# Patient Record
Sex: FEMALE | Race: AMERICAN INDIAN OR ALASKA NATIVE
[De-identification: names, ages, dates, MRNs, and addresses within clinical notes are randomized per-mention and may not be internally consistent; named-entity substitution may affect disease eponyms.]

---

## 2018-08-12 ENCOUNTER — HOSPITAL ENCOUNTER (EMERGENCY)
Dept: HOSPITAL 42 - ED | Age: 23
Discharge: HOME | End: 2018-08-12
Payer: MEDICAID

## 2018-08-12 VITALS — OXYGEN SATURATION: 98 % | RESPIRATION RATE: 18 BRPM

## 2018-08-12 VITALS — SYSTOLIC BLOOD PRESSURE: 125 MMHG | DIASTOLIC BLOOD PRESSURE: 79 MMHG | HEART RATE: 72 BPM | TEMPERATURE: 98.5 F

## 2018-08-12 DIAGNOSIS — F41.9: ICD-10-CM

## 2018-08-12 DIAGNOSIS — R11.10: Primary | ICD-10-CM

## 2018-08-12 LAB
ALBUMIN SERPL-MCNC: 5 G/DL (ref 3–4.8)
ALBUMIN/GLOB SERPL: 1.3 {RATIO} (ref 1.1–1.8)
ALT SERPL-CCNC: 16 U/L (ref 7–56)
AMORPH SED URNS QL MICRO: (no result)
APAP SERPL-MCNC: < 10 UG/ML (ref 10–20)
APPEARANCE UR: (no result)
APTT BLD: 22 SECONDS (ref 25.1–36.5)
AST SERPL-CCNC: 24 U/L (ref 14–36)
BACTERIA #/AREA URNS HPF: (no result) /[HPF]
BASOPHILS # BLD AUTO: 0.01 K/MM3 (ref 0–2)
BASOPHILS NFR BLD: 0.1 % (ref 0–3)
BILIRUB UR-MCNC: NEGATIVE MG/DL
BUN SERPL-MCNC: 11 MG/DL (ref 7–21)
CALCIUM SERPL-MCNC: 10.2 MG/DL (ref 8.4–10.5)
COLOR UR: YELLOW
EOSINOPHIL # BLD: 0 10*3/UL (ref 0–0.7)
EOSINOPHIL NFR BLD: 0.1 % (ref 1.5–5)
EPI CELLS #/AREA URNS HPF: (no result) /HPF (ref 0–5)
ERYTHROCYTE [DISTWIDTH] IN BLOOD BY AUTOMATED COUNT: 13.2 % (ref 11.5–14.5)
GFR NON-AFRICAN AMERICAN: > 60
GLUCOSE UR STRIP-MCNC: NEGATIVE MG/DL
GRANULOCYTES # BLD: 4.53 10*3/UL (ref 1.4–6.5)
GRANULOCYTES NFR BLD: 62.8 % (ref 50–68)
HCG,QUALITATIVE URINE: NEGATIVE
HGB BLD-MCNC: 14.1 G/DL (ref 12–16)
INR PPP: 1.06
LEUKOCYTE ESTERASE UR-ACNC: (no result) LEU/UL
LIPASE SERPL-CCNC: 30 U/L (ref 23–300)
LYMPHOCYTES # BLD: 1.8 10*3/UL (ref 1.2–3.4)
LYMPHOCYTES NFR BLD AUTO: 24.3 % (ref 22–35)
MCH RBC QN AUTO: 27.1 PG (ref 25–35)
MCHC RBC AUTO-ENTMCNC: 35.7 G/DL (ref 31–37)
MCV RBC AUTO: 76 FL (ref 80–105)
MONOCYTES # BLD AUTO: 0.9 10*3/UL (ref 0.1–0.6)
MONOCYTES NFR BLD: 12.7 % (ref 1–6)
PH UR STRIP: 8 [PH] (ref 4.7–8)
PLATELET # BLD: 318 10^3/UL (ref 120–450)
PMV BLD AUTO: 10.4 FL (ref 7–11)
PROT UR STRIP-MCNC: 100 MG/DL
PROTHROMBIN TIME: 12.2 SECONDS (ref 9.4–12.5)
RBC # BLD AUTO: 5.2 10^6/UL (ref 3.5–6.1)
RBC # UR STRIP: (no result) /UL
SALICYLATES SERPL-MCNC: < 1 MG/DL (ref 2–20)
SP GR UR STRIP: 1.02 (ref 1–1.03)
UROBILINOGEN UR STRIP-ACNC: 0.2 E.U./DL
WBC # BLD AUTO: 7.2 10^3/UL (ref 4.5–11)

## 2018-08-12 PROCEDURE — 83735 ASSAY OF MAGNESIUM: CPT

## 2018-08-12 PROCEDURE — 96375 TX/PRO/DX INJ NEW DRUG ADDON: CPT

## 2018-08-12 PROCEDURE — 85025 COMPLETE CBC W/AUTO DIFF WBC: CPT

## 2018-08-12 PROCEDURE — 96361 HYDRATE IV INFUSION ADD-ON: CPT

## 2018-08-12 PROCEDURE — 85730 THROMBOPLASTIN TIME PARTIAL: CPT

## 2018-08-12 PROCEDURE — 83690 ASSAY OF LIPASE: CPT

## 2018-08-12 PROCEDURE — 85610 PROTHROMBIN TIME: CPT

## 2018-08-12 PROCEDURE — 96376 TX/PRO/DX INJ SAME DRUG ADON: CPT

## 2018-08-12 PROCEDURE — 84703 CHORIONIC GONADOTROPIN ASSAY: CPT

## 2018-08-12 PROCEDURE — 83992 ASSAY FOR PHENCYCLIDINE: CPT

## 2018-08-12 PROCEDURE — 81001 URINALYSIS AUTO W/SCOPE: CPT

## 2018-08-12 PROCEDURE — 80361 OPIATES 1 OR MORE: CPT

## 2018-08-12 PROCEDURE — 80053 COMPREHEN METABOLIC PANEL: CPT

## 2018-08-12 PROCEDURE — 99284 EMERGENCY DEPT VISIT MOD MDM: CPT

## 2018-08-12 PROCEDURE — 80329 ANALGESICS NON-OPIOID 1 OR 2: CPT

## 2018-08-12 PROCEDURE — 80345 DRUG SCREENING BARBITURATES: CPT

## 2018-08-12 PROCEDURE — 80324 DRUG SCREEN AMPHETAMINES 1/2: CPT

## 2018-08-12 PROCEDURE — 80353 DRUG SCREENING COCAINE: CPT

## 2018-08-12 PROCEDURE — 71046 X-RAY EXAM CHEST 2 VIEWS: CPT

## 2018-08-12 PROCEDURE — 74177 CT ABD & PELVIS W/CONTRAST: CPT

## 2018-08-12 PROCEDURE — 80358 DRUG SCREENING METHADONE: CPT

## 2018-08-12 PROCEDURE — 87086 URINE CULTURE/COLONY COUNT: CPT

## 2018-08-12 PROCEDURE — 80349 CANNABINOIDS NATURAL: CPT

## 2018-08-12 PROCEDURE — 80320 DRUG SCREEN QUANTALCOHOLS: CPT

## 2018-08-12 PROCEDURE — 80346 BENZODIAZEPINES1-12: CPT

## 2018-08-12 PROCEDURE — 96374 THER/PROPH/DIAG INJ IV PUSH: CPT

## 2018-08-12 PROCEDURE — 93005 ELECTROCARDIOGRAM TRACING: CPT

## 2018-08-12 NOTE — ED PDOC
Arrival/HPI





- General


Chief Complaint: Anxiety


Time Seen by Provider: 08/12/18 07:44


Historian: Patient





- History of Present Illness


Narrative History of Present Illness (Text): 





08/12/18 08:11


23 year old female, visiting from [place], [place], with past medical history 

of anxiety, presents to the Emergency department accompanied by boyfriend 

complaining of nausea and vomiting secondary to worsening anxiety since 3 days. 

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 

now experiencing nausea, vomiting and epigastric pain. Patient informs similar 

symptoms in the past for which she was hospitalized and "nothing was wrong" As 

per patient, she had a recent endoscopy performed last month with no acute 

findings. Patient denies any fever, chills, diarrhea, hematemesis, hematuria, 

dysuria, urinary output changes, chest pain, shortness of breath or any other 

complaints. Patient presents to the Emergency department for medical 

evaluation. 





08/12/18 17:57





Time/Duration: < week (3 days)


Symptom Onset: Gradual


Symptom Course: Unchanged


Activities at Onset: Light





Past Medical History





- Provider Review


Nursing Documentation Reviewed: Yes





- Infectious Disease


Hx of Infectious Diseases: None





- Pulmonary


Hx Asthma: Yes





- Psychiatric


Hx Anxiety: Yes


Hx Substance Use: No





Family/Social History





- Physician Review


Nursing Documentation Reviewed: Yes


Family/Social History: No Known Family HX


Smoking Status: Never Smoked


Hx Alcohol Use: No


Hx Substance Use: No





Allergies/Home Meds


Allergies/Adverse Reactions: 


Allergies





No Known Allergies Allergy (Verified 08/12/18 08:32)


 











Review of Systems





- Physician Review


All systems were reviewed & negative as marked: Yes





- Review of Systems


Constitutional: absent: Fevers


Respiratory: absent: SOB, Cough


Cardiovascular: absent: Chest Pain


Gastrointestinal: Abdominal Pain, Nausea, Vomiting.  absent: Diarrhea, 

Hematemesis


Genitourinary Female: absent: Dysuria, Hematuria, Urine Output Changes





Physical Exam


Vital Signs Reviewed: Yes


Vital Signs











  Temp Pulse Resp BP Pulse Ox


 


 08/12/18 11:27  98.5 F  72  18  125/79  98


 


 08/12/18 11:00  98.5 F  72  18  125/79  98


 


 08/12/18 09:35  99.5 F  89  18  132/89  98


 


 08/12/18 07:54  97.9 F  98 H  18  118/90  98











Temperature: Afebrile


Blood Pressure: Normal


Pulse: Tachycardic


Respiratory Rate: Normal


Appearance: Positive for: Other (Anxious)


Pain Distress: None


Mental Status: Positive for: Alert and Oriented X 3





- Systems Exam


Head: Present: Atraumatic, Normocephalic


Pupils: Present: PERRL


Extroacular Muscles: Present: EOMI


Conjunctiva: Present: Normal


Mouth: Present: Moist Mucous Membranes


Neck: Present: Normal Range of Motion


Respiratory/Chest: Present: Clear to Auscultation, Good Air Exchange.  No: 

Respiratory Distress, Accessory Muscle Use


Cardiovascular: Present: Regular Rate and Rhythm, Normal S1, S2.  No: Murmurs


Abdomen: Present: Tenderness (epigastric tenderness ).  No: Distention, 

Peritoneal Signs


Back: Present: Normal Inspection


Upper Extremity: Present: Normal Inspection.  No: Cyanosis, Edema


Lower Extremity: Present: Normal Inspection.  No: Edema


Neurological: Present: GCS=15, CN II-XII Intact, Speech Normal


Skin: Present: Warm, Dry, Normal Color.  No: Rashes


Psychiatric: Present: Alert, Oriented x 3, Anxious





Medical Decision Making


ED Course and Treatment: 





08/12/18 08:11


Impression: 23 year old female presents to the Emergency department complaining 

of anxiety and abdominal pain associated with nausea and vomiting. 





Differential Diagnosis included but are not limited to: anxiety vs gastritis/

pancreatitis pud





Plan:


-- EKG


-- CT of Abdomen/Pelvis


-- CXR


-- Labs


-- Ativan


-- IV Fluids


-- Urinalysis


-- Reassess and disposition





Progress Notes:





08/12/18 09:00


EKG: Ordered, reviewed, and independently interpreted the EKG.


Rate : 90 BPM


Rhythm : NSR


Interpretation : Patient shaking during exam secondary to emotion.





08/12/18 11:05


CT of Abdomen/Pelvis reviewed by radiologist, shows: 


Mild hepatomegaly with mild fatty infiltration. 


Urinary bladder exhibits thick-walled appearance in part due to incomplete 

distention however a cystitis should be excluded with followup urinalysis.





08/12/18 17:58


labs imagign neg. pt observed sleeping inad. abd soft. h/h stable. pt observed 

drinkning water in nad. advise outpt fu and return precautions





- Lab Interpretations


Lab Results: 








 08/12/18 08:10 





 08/12/18 08:10 





 Lab Results





08/12/18 08:20: Urine Opiates Screen Negative, Urine Methadone Screen Negative, 

Ur Barbiturates Screen Negative, Ur Phencyclidine Scrn Negative, Ur 

Amphetamines Screen Negative, U Benzodiazepines Scrn Negative, U Oth Cocaine 

Metabols Negative, U Cannabinoids Screen Positive H


08/12/18 08:20: Urine Color Yellow, Urine Appearance Cloudy, Urine pH 8.0, Ur 

Specific Gravity 1.020, Urine Protein 100 H, Urine Glucose (UA) Negative, Urine 

Ketones >=80, Urine Blood Trace-intact H, Urine Nitrate Negative, Urine 

Bilirubin Negative, Urine Urobilinogen 0.2, Ur Leukocyte Esterase Trace H, 

Urine RBC 1 - 3, Urine WBC 2 - 5, Ur Epithelial Cells Tntc, Amorphous Sediment 

Moderate, Urine Bacteria Trace, Urine HCG, Qual Negative


08/12/18 08:10: Alcohol, Quantitative < 10


08/12/18 08:10: Salicylates < 1 L, Acetaminophen < 10.0 L


08/12/18 08:10: Sodium 139, Potassium 3.6, Chloride 105, Carbon Dioxide 17 L, 

Anion Gap 21 H, BUN 11, Creatinine 0.6 L, Est GFR ( Amer) > 60, Est GFR (

Non-Af Amer) > 60, Random Glucose 125 H, Calcium 10.2, Magnesium 1.7, Total 

Bilirubin 0.8, AST 24, ALT 16, Alkaline Phosphatase 59, Total Protein 8.9 H, 

Albumin 5.0 H, Globulin 3.9, Albumin/Globulin Ratio 1.3, Lipase 30


08/12/18 08:10: PT 12.2, INR 1.06, APTT 22.0 L


08/12/18 08:10: WBC 7.2, RBC 5.20, Hgb 14.1, Hct 39.5, MCV 76.0 L, MCH 27.1, 

MCHC 35.7, RDW 13.2, Plt Count 318, MPV 10.4, Gran % 62.8, Lymph % (Auto) 24.3, 

Mono % (Auto) 12.7 H, Eos % (Auto) 0.1 L, Baso % (Auto) 0.1, Gran # 4.53, Lymph 

# (Auto) 1.8, Mono # (Auto) 0.9 H, Eos # (Auto) 0.0, Baso # (Auto) 0.01











- RAD Interpretation


Radiology Orders: 








08/12/18 09:05


ABD & PELVIS IV CONTRAST ONLY [CT] Stat 


CHEST TWO VIEWS (PA/LAT) [RAD] Stat 














- EKG Interpretation


Interpreted by ED Physician: Yes


Type: 12 lead EKG





- Medication Orders


Current Medication Orders: 











Discontinued Medications





Al Hydrox/Mg Hydrox/Simethicone (Maalox Plus 30 Ml)  30 ml PO STAT STA


   Stop: 08/12/18 10:25


   Last Admin: 08/12/18 10:32  Dose: 30 ml





Belladonna/Phenobarbital (Donnatal Elixir)  10 ml PO STAT STA


   Stop: 08/12/18 10:25


   Last Admin: 08/12/18 10:31  Dose: 10 ml





Sodium Chloride (Sodium Chloride 0.9%)  1,000 mls @ 1,000 mls/hr IV .Q1H STA


   Stop: 08/12/18 09:10


   Last Admin: 08/12/18 08:38  Dose: 1,000 mls/hr





eMAR Start Stop


 Document     08/12/18 08:38  LM  (Rec: 08/12/18 08:38  [id number]


     Intravenous Solution


      Start Date                                 08/12/18


      Start Time                                 08:38


      End Date                                   08/12/18


      End time                                   09:39


      Total Infusion Time                        61





Lidocaine HCl (Lidocaine 2% Viscous)  10 ml MM STAT STA


   Stop: 08/12/18 10:25


   Last Admin: 08/12/18 10:32  Dose: 10 ml





Lorazepam (Ativan)  0.5 mg IVP ONCE ONE


   PRN Reason: Protocol


   Stop: 08/12/18 08:12


   Last Admin: 08/12/18 08:37  Dose: 0.5 mg





IVP Administration


 Document     08/12/18 08:37  [facility]  (Rec: 08/12/18 08:38  [id number]


     Charges for Administration


      # of IVP Administrations                   1





Ondansetron HCl (Zofran Inj)  4 mg IVP STAT STA


   Stop: 08/12/18 08:41


   Last Admin: 08/12/18 09:21  Dose: 4 mg





IVP Administration


 Document     08/12/18 09:21  EW  (Rec: 08/12/18 09:21  EWO  [id number])


     Charges for Administration


      # of IVP Administrations                   1





Ondansetron HCl (Zofran Inj)  4 mg IVP STAT STA


   Stop: 08/12/18 10:32


   Last Admin: 08/12/18 10:38  Dose: 4 mg





IVP Administration


 Document     08/12/18 10:38  EWO  (Rec: 08/12/18 10:38  EWO  [id number])


     Charges for Administration


      # of IVP Administrations                   2





Pantoprazole Sodium (Protonix Inj)  40 mg IVP STAT STA


   Stop: 08/12/18 08:12


   Last Admin: 08/12/18 08:37  Dose: 40 mg





IVP Administration


 Document     08/12/18 08:37  [facility]  (Rec: 08/12/18 08:37  [id number]


     Charges for Administration


      # of IVP Administrations                   1














- Scribe Statement


The provider has reviewed the documentation as recorded by the Scribe


Tiago Cardenas.





All medical record entries made by the Scribe were at my direction and 

personally dictated by me. I have reviewed the chart and agree that the record 

accurately reflects my personal performance of the history, physical exam, 

medical decision making, and the department course for this patient. I have 

also personally directed, reviewed, and agree with the discharge instructions 

and disposition.





Disposition/Present on Arrival





- Present on Arrival


Any Indicators Present on Arrival: No


History of DVT/PE: No


History of Uncontrolled Diabetes: No


Urinary Catheter: No


History of Decub. Ulcer: No


History Surgical Site Infection Following: None





- Disposition


Have Diagnosis and Disposition been Completed?: Yes


Diagnosis: 


 Vomiting





Disposition: HOME/ ROUTINE


Disposition Time: 05:00


Condition: STABLE


Discharge Instructions (ExitCare):  Anxiety, Adult (DC), Nausea and Vomiting, 

Adult (DC)


Additional Instructions: 


please follow up with your doctor. return to er with worsening symptoms or 

concerns. 


Prescriptions: 


Ondansetron ODT [Zofran ODT] 4 mg PO Q8 PRN #20 odt


 PRN Reason: Nausea/Vomiting


Referrals: 


 Service [Outside] - Follow up with primary


[facility] [Outside] - Follow up with primary


Forms:  [email or web address] (English)

## 2018-08-12 NOTE — RAD
Date of service: 



08/12/2018



HISTORY:

abd pain  



COMPARISON:

No prior.



TECHNIQUE:

Chest PA and lateral



FINDINGS:



LUNGS:

No active pulmonary disease.



PLEURA:

No significant pleural effusion identified. No pneumothorax apparent.



CARDIOVASCULAR:

Normal.



OSSEOUS STRUCTURES:

No significant abnormalities.



VISUALIZED UPPER ABDOMEN:

Normal.



OTHER FINDINGS:

None.



IMPRESSION:

No active disease.

## 2018-08-12 NOTE — CT
Date of service: 



08/12/2018



PROCEDURE:  CT Abdomen and Pelvis with contrast



HISTORY:

Upper abdominal pain



COMPARISON:

None.



TECHNIQUE:

Contiguous helical/ transaxial sections of the abdomen pelvis 

performed following intravenous injection of approximately 96cc 

Omnipaque 350 contrast material T8 in.  Additional 2D sagittal and 

coronal reformats generated



Radiation dose:



Total exam DLP = 299.4 mGy-cm.



This CT exam was performed using one or more of the following dose 

reduction techniques: Automated exposure control, adjustment of the 

mA and/or kV according to patient size, and/or use of iterative 

reconstruction technique.



FINDINGS:



LOWER THORAX:

Lung bases clear.  No focal consolidation effusion or basilar 

pneumothorax.  Heart size is within range of normal. No significant 

pericardial effusion.  Tiny hiatal hernia. 



LIVER:

Liver is mildly enlarged measuring nearly 20 cm in CC dimension. 

Minor CF ptosis. No obvious hepatic mass or collection.  Portal and 

splenic veins are opacified. 



GALLBLADDER AND BILE DUCTS:

Gallbladder is physiologically distended.  No evidence of 

intraluminal gallbladder calculi. . 



PANCREAS:

Unremarkable. No gross lesion or ductal dilatation.



SPLEEN:

Unremarkable. 



ADRENALS:

There are no adrenal lesions. 



KIDNEYS AND URETERS:

Kidneys demonstrate relatively symmetric nephrograms.  No evidence of 

nephrolithiasis or hydronephrosis. 



VASCULATURE:

Unremarkable. No aortic aneurysm. 



BOWEL:

Evaluation of the bowel is limited due to the lack of oral contrast 

material. Stomach is incompletely distended which in part accounts 

for thick-walled appearance.  Visualized loops of small bowel exhibit 

normal contour and caliber. No evidence acute mechanical small bowel 

obstruction. Large bowel predominately collapsed with the exception 

stool seen in the cecum - proximal ascending colon and rectum. .



APPENDIX:

The appendix is not seen with complete certainty however what 

probably represents a partially visualized normal appendix of best 

seen on axial 125- 130. No obvious inflammatory changes seen in the 

right lower quadrant of the abdomen. 



PERITONEUM:

Unremarkable. No free fluid. No free air. 



LYMPH NODES:

Unremarkable. No enlarged lymph nodes. 



BLADDER:

Urinary bladder is incompletely distended which presumably in part 

accounts for thick-walled appearance.  Cystitis not excluded.  

Correlation with urinalysis. 



REPRODUCTIVE:

Uterus and adnexal structures appear grossly unremarkable. 



BONES:

No acute fracture. No evidence of significant degenerative 

spondylosis. 



OTHER FINDINGS:

None.



IMPRESSION:

Mild hepatomegaly with mild fatty infiltration. 



Urinary bladder exhibits thick-walled appearance in part due to 

incomplete distention however a cystitis should be excluded with 

followup urinalysis.